# Patient Record
Sex: MALE | Race: BLACK OR AFRICAN AMERICAN | NOT HISPANIC OR LATINO | Employment: FULL TIME | URBAN - NONMETROPOLITAN AREA
[De-identification: names, ages, dates, MRNs, and addresses within clinical notes are randomized per-mention and may not be internally consistent; named-entity substitution may affect disease eponyms.]

---

## 2017-10-08 ENCOUNTER — HOSPITAL ENCOUNTER (EMERGENCY)
Facility: HOSPITAL | Age: 47
Discharge: HOME | End: 2017-10-08
Attending: EMERGENCY MEDICINE | Admitting: EMERGENCY MEDICINE

## 2017-10-08 VITALS
HEIGHT: 71 IN | RESPIRATION RATE: 18 BRPM | WEIGHT: 168 LBS | HEART RATE: 65 BPM | TEMPERATURE: 99.8 F | SYSTOLIC BLOOD PRESSURE: 120 MMHG | BODY MASS INDEX: 23.52 KG/M2 | DIASTOLIC BLOOD PRESSURE: 72 MMHG | OXYGEN SATURATION: 98 %

## 2017-10-08 DIAGNOSIS — R36.9 PENILE DISCHARGE: Primary | ICD-10-CM

## 2017-10-08 LAB
BACTERIA UR QL AUTO: ABNORMAL /HPF
BILIRUB UR QL STRIP: NEGATIVE
CLARITY UR: CLEAR
COLOR UR: YELLOW
GLUCOSE UR STRIP-MCNC: NEGATIVE MG/DL
HGB UR QL STRIP.AUTO: ABNORMAL
HYALINE CASTS UR QL AUTO: ABNORMAL /LPF
KETONES UR QL STRIP: NEGATIVE
LEUKOCYTE ESTERASE UR QL STRIP.AUTO: ABNORMAL
NITRITE UR QL STRIP: NEGATIVE
PH UR STRIP.AUTO: 7 [PH] (ref 5–8)
PROT UR QL STRIP: NEGATIVE
RBC # UR: ABNORMAL /HPF
REF LAB TEST METHOD: ABNORMAL
SP GR UR STRIP: 1.01 (ref 1–1.03)
SQUAMOUS #/AREA URNS HPF: ABNORMAL /HPF
UROBILINOGEN UR QL STRIP: ABNORMAL
WBC UR QL AUTO: ABNORMAL /HPF

## 2017-10-08 PROCEDURE — 25010000002 CEFTRIAXONE PER 250 MG: Performed by: EMERGENCY MEDICINE

## 2017-10-08 PROCEDURE — 96372 THER/PROPH/DIAG INJ SC/IM: CPT

## 2017-10-08 PROCEDURE — 81001 URINALYSIS AUTO W/SCOPE: CPT | Performed by: EMERGENCY MEDICINE

## 2017-10-08 PROCEDURE — 99283 EMERGENCY DEPT VISIT LOW MDM: CPT

## 2017-10-08 PROCEDURE — 87491 CHLMYD TRACH DNA AMP PROBE: CPT | Performed by: EMERGENCY MEDICINE

## 2017-10-08 PROCEDURE — 87591 N.GONORRHOEAE DNA AMP PROB: CPT | Performed by: EMERGENCY MEDICINE

## 2017-10-08 PROCEDURE — 87086 URINE CULTURE/COLONY COUNT: CPT | Performed by: EMERGENCY MEDICINE

## 2017-10-08 RX ORDER — LIDOCAINE HYDROCHLORIDE 10 MG/ML
0.9 INJECTION, SOLUTION EPIDURAL; INFILTRATION; INTRACAUDAL; PERINEURAL ONCE
Status: DISCONTINUED | OUTPATIENT
Start: 2017-10-08 | End: 2017-10-08

## 2017-10-08 RX ORDER — AZITHROMYCIN 250 MG/1
1000 TABLET, FILM COATED ORAL ONCE
Status: COMPLETED | OUTPATIENT
Start: 2017-10-08 | End: 2017-10-08

## 2017-10-08 RX ORDER — CEFTRIAXONE SODIUM 250 MG/1
250 INJECTION, POWDER, FOR SOLUTION INTRAMUSCULAR; INTRAVENOUS ONCE
Status: COMPLETED | OUTPATIENT
Start: 2017-10-08 | End: 2017-10-08

## 2017-10-08 RX ADMIN — AZITHROMYCIN MONOHYDRATE 1000 MG: 250 TABLET ORAL at 14:29

## 2017-10-08 RX ADMIN — CEFTRIAXONE SODIUM 250 MG: 250 INJECTION, POWDER, FOR SOLUTION INTRAMUSCULAR; INTRAVENOUS at 14:31

## 2017-10-08 NOTE — ED PROVIDER NOTES
Subjective   History of Present Illness   47-year-old male otherwise healthy with 2 days of penile discharge that is milky white in nature.  States that he is sexually active with one female who has similar vaginal discharge at this time.  Denies fevers, chills, testicular pain or swelling or other concerns.    Review of Systems   Genitourinary: Positive for discharge.   All other systems reviewed and are negative.      History reviewed. No pertinent past medical history.    No Known Allergies    History reviewed. No pertinent surgical history.    History reviewed. No pertinent family history.    Social History     Social History   • Marital status: Single     Spouse name: N/A   • Number of children: N/A   • Years of education: N/A     Social History Main Topics   • Smoking status: Current Every Day Smoker     Types: Cigarettes   • Smokeless tobacco: None   • Alcohol use Yes      Comment: occassional   • Drug use: No   • Sexual activity: Not Asked     Other Topics Concern   • None     Social History Narrative   • None           Objective   Physical Exam   Constitutional: He is oriented to person, place, and time. He appears well-developed and well-nourished.   Cardiovascular: Normal rate and regular rhythm.    Pulmonary/Chest: Effort normal. No respiratory distress.   Genitourinary: No penile tenderness.   Genitourinary Comments: White, thick discharge at urethral meatus. Testicles non-tender and not swollen.    Neurological: He is alert and oriented to person, place, and time.   Skin: Skin is warm and dry. He is not diaphoretic.   Psychiatric: He has a normal mood and affect. His behavior is normal.   Nursing note and vitals reviewed.      Procedures         ED Course  ED Course                  MDM   47M here w/ suspected STI. Will send ua, gc / chlamydia and treat empirically.     2:41 PM UA unremarkable. Treated empirically as above. Discussed the importance of refraining from sexual intercourse for next 7 days.  Discussed return to care precautions.     Final diagnoses:   Penile discharge            Navi Marie MD  10/08/17 0769

## 2017-10-10 LAB
BACTERIA SPEC AEROBE CULT: NO GROWTH
C TRACH RRNA SPEC DONR QL NAA+PROBE: NEGATIVE
N GONORRHOEA DNA SPEC QL NAA+PROBE: POSITIVE

## 2018-05-11 ENCOUNTER — APPOINTMENT (OUTPATIENT)
Dept: GENERAL RADIOLOGY | Facility: HOSPITAL | Age: 48
End: 2018-05-11

## 2018-05-11 ENCOUNTER — HOSPITAL ENCOUNTER (EMERGENCY)
Facility: HOSPITAL | Age: 48
Discharge: HOME | End: 2018-05-11
Attending: EMERGENCY MEDICINE | Admitting: EMERGENCY MEDICINE

## 2018-05-11 VITALS
RESPIRATION RATE: 16 BRPM | SYSTOLIC BLOOD PRESSURE: 116 MMHG | HEIGHT: 71 IN | HEART RATE: 66 BPM | WEIGHT: 139.4 LBS | TEMPERATURE: 98.2 F | OXYGEN SATURATION: 98 % | DIASTOLIC BLOOD PRESSURE: 72 MMHG | BODY MASS INDEX: 19.52 KG/M2

## 2018-05-11 DIAGNOSIS — S62.326A CLOSED DISPLACED FRACTURE OF SHAFT OF FIFTH METACARPAL BONE OF RIGHT HAND, INITIAL ENCOUNTER: Primary | ICD-10-CM

## 2018-05-11 PROCEDURE — 99283 EMERGENCY DEPT VISIT LOW MDM: CPT

## 2018-05-11 PROCEDURE — 73130 X-RAY EXAM OF HAND: CPT

## 2018-05-11 RX ORDER — HYDROCODONE BITARTRATE AND ACETAMINOPHEN 5; 325 MG/1; MG/1
1 TABLET ORAL EVERY 8 HOURS PRN
Qty: 12 TABLET | Refills: 0 | Status: SHIPPED | OUTPATIENT
Start: 2018-05-11 | End: 2018-05-17 | Stop reason: HOSPADM

## 2018-05-11 RX ORDER — HYDROCODONE BITARTRATE AND ACETAMINOPHEN 7.5; 325 MG/1; MG/1
1 TABLET ORAL ONCE
Status: COMPLETED | OUTPATIENT
Start: 2018-05-11 | End: 2018-05-11

## 2018-05-11 RX ADMIN — HYDROCODONE BITARTRATE AND ACETAMINOPHEN 1 TABLET: 7.5; 325 TABLET ORAL at 15:14

## 2018-05-11 NOTE — DISCHARGE INSTRUCTIONS
Keep the splint dry.  Loosen the Ace wraps if your fingers become pale, purple or blue.    Follow-up with Dr. Garcia, orthopedic specialist, in 2-3 days for definitive management.  Call for appointment.     Off work for 2 days.  No lifting, pushing or pulling with the affected hand until cleared by Dr. Garcia.

## 2018-05-11 NOTE — ED PROVIDER NOTES
Subjective   47-year-old black male apparently injured his right hand lifting weights 2 days ago.  States the accidentally punched a 40 pound weight that was sitting on the floor, when he brought his hands down after lifting.  No paresthesias or loss of function.  Complains of moderate aching type pain to the distal fifth metacarpal region.  No lacerations.  Slight deformity noted.  He has never broken this hand before.    Aggravating factors: Palpation or movement.  Alleviating factors: Rest.  Treatment prior to arrival: Rest.        History provided by:  Patient  History limited by: nothing.   used: No        Review of Systems   Constitutional: Negative.    HENT: Negative.    Eyes: Negative.    Respiratory: Negative.    Cardiovascular: Negative.  Negative for chest pain.   Gastrointestinal: Negative.  Negative for abdominal pain.   Endocrine: Negative.    Genitourinary: Negative for dysuria.   Musculoskeletal: Positive for arthralgias and joint swelling.   Skin: Negative.    Allergic/Immunologic: Negative.    Neurological: Negative.    Hematological: Negative.    Psychiatric/Behavioral: Negative.    All other systems reviewed and are negative.      History reviewed. No pertinent past medical history.    No Known Allergies    History reviewed. No pertinent surgical history.    History reviewed. No pertinent family history.    Social History     Social History   • Marital status: Single     Social History Main Topics   • Smoking status: Current Every Day Smoker     Types: Cigarettes   • Smokeless tobacco: Never Used   • Alcohol use Yes      Comment: occassional   • Drug use: No   • Sexual activity: Defer     Other Topics Concern   • Not on file           Objective   Physical Exam   Constitutional: He is oriented to person, place, and time. He appears well-developed and well-nourished. No distress.   HENT:   Head: Normocephalic and atraumatic.   Right Ear: External ear normal.   Left Ear: External  ear normal.   Eyes: EOM are normal. Pupils are equal, round, and reactive to light.   Neck: Normal range of motion. Neck supple.   Cardiovascular: Normal rate, regular rhythm and normal heart sounds.    Pulmonary/Chest: Effort normal and breath sounds normal. No stridor. He has no wheezes. He exhibits no tenderness.   Musculoskeletal: Normal range of motion. He exhibits no edema.   The distal right fifth metatarsal is moderately tender and swollen.  Neurovascular and skin are intact to the right hand.  The right wrist is nontender.   Neurological: He is alert and oriented to person, place, and time.   Skin: Skin is warm and dry. No rash noted. He is not diaphoretic.   Psychiatric: He has a normal mood and affect. His behavior is normal. Judgment and thought content normal.   Nursing note and vitals reviewed.      Splint - Cast - Strapping  Date/Time: 5/11/2018 3:12 PM  Performed by: SHERRY FRANKLIN  Authorized by: INDER MEJIAS     Consent:     Consent obtained:  Verbal    Consent given by:  Patient    Risks discussed:  Discoloration and swelling  Pre-procedure details:     Sensation:  Normal  Procedure details:     Laterality:  Right    Location:  Hand    Hand:  R hand    Splint type:  Ulnar gutter    Supplies:  Ortho-Glass, elastic bandage and cotton padding  Post-procedure details:     Pain:  Improved    Sensation:  Normal    Patient tolerance of procedure:  Tolerated well, no immediate complications               ED Course  ED Course                  MDM  Number of Diagnoses or Management Options  Closed displaced fracture of shaft of fifth metacarpal bone of right hand, initial encounter: new and requires workup  Diagnosis management comments: I explained to the patient that this may require surgery as it is angulated and displaced.  He verbalized understanding.  He understands he needs to see orthopedics as soon as possible.  Dr. Garcia's phone number was supplied to him.       Amount and/or  Complexity of Data Reviewed  Tests in the radiology section of CPT®: ordered and reviewed  Discuss the patient with other providers: yes    Risk of Complications, Morbidity, and/or Mortality  Presenting problems: moderate  Diagnostic procedures: low  Management options: moderate    Patient Progress  Patient progress: stable        Final diagnoses:   Closed displaced fracture of shaft of fifth metacarpal bone of right hand, initial encounter            David Dominguez PA-C  05/11/18 1517       David Dominguez PA-C  05/11/18 1524

## 2018-05-14 ENCOUNTER — APPOINTMENT (OUTPATIENT)
Dept: PREADMISSION TESTING | Facility: HOSPITAL | Age: 48
End: 2018-05-14

## 2018-05-14 ENCOUNTER — OFFICE VISIT (OUTPATIENT)
Dept: ORTHOPEDIC SURGERY | Facility: CLINIC | Age: 48
End: 2018-05-14

## 2018-05-14 ENCOUNTER — HOSPITAL ENCOUNTER (OUTPATIENT)
Dept: GENERAL RADIOLOGY | Facility: HOSPITAL | Age: 48
Discharge: HOME | End: 2018-05-14
Admitting: PHYSICIAN ASSISTANT

## 2018-05-14 ENCOUNTER — PREP FOR SURGERY (OUTPATIENT)
Dept: OTHER | Facility: HOSPITAL | Age: 48
End: 2018-05-14

## 2018-05-14 VITALS — WEIGHT: 145 LBS | BODY MASS INDEX: 20.3 KG/M2 | HEIGHT: 71 IN

## 2018-05-14 VITALS — HEIGHT: 71 IN | WEIGHT: 139 LBS | BODY MASS INDEX: 19.46 KG/M2 | RESPIRATION RATE: 18 BRPM

## 2018-05-14 DIAGNOSIS — Z01.818 PRE-OP TESTING: Primary | ICD-10-CM

## 2018-05-14 DIAGNOSIS — Z01.818 PRE-OP TESTING: ICD-10-CM

## 2018-05-14 DIAGNOSIS — S62.396A CLOSED DISPLACED FRACTURE OF OTHER PART OF FIFTH METACARPAL BONE OF RIGHT HAND, INITIAL ENCOUNTER: Primary | ICD-10-CM

## 2018-05-14 LAB
ALBUMIN SERPL-MCNC: 4.7 G/DL (ref 3.5–5)
ALBUMIN/GLOB SERPL: 1.3 G/DL (ref 1–2)
ALP SERPL-CCNC: 88 U/L (ref 38–126)
ALT SERPL W P-5'-P-CCNC: 30 U/L (ref 13–69)
ANION GAP SERPL CALCULATED.3IONS-SCNC: 14.1 MMOL/L (ref 10–20)
AST SERPL-CCNC: 43 U/L (ref 15–46)
BASOPHILS # BLD AUTO: 0.02 10*3/MM3 (ref 0–0.2)
BASOPHILS NFR BLD AUTO: 0.4 % (ref 0–2.5)
BILIRUB SERPL-MCNC: 0.3 MG/DL (ref 0.2–1.3)
BUN BLD-MCNC: 15 MG/DL (ref 7–20)
BUN/CREAT SERPL: 15 (ref 6.3–21.9)
CALCIUM SPEC-SCNC: 9.3 MG/DL (ref 8.4–10.2)
CHLORIDE SERPL-SCNC: 102 MMOL/L (ref 98–107)
CO2 SERPL-SCNC: 32 MMOL/L (ref 26–30)
CREAT BLD-MCNC: 1 MG/DL (ref 0.6–1.3)
DEPRECATED RDW RBC AUTO: 48.4 FL (ref 37–54)
EOSINOPHIL # BLD AUTO: 0.06 10*3/MM3 (ref 0–0.7)
EOSINOPHIL NFR BLD AUTO: 1.1 % (ref 0–7)
ERYTHROCYTE [DISTWIDTH] IN BLOOD BY AUTOMATED COUNT: 14 % (ref 11.5–14.5)
GFR SERPL CREATININE-BSD FRML MDRD: 97 ML/MIN/1.73
GLOBULIN UR ELPH-MCNC: 3.5 GM/DL
GLUCOSE BLD-MCNC: 97 MG/DL (ref 74–98)
HCT VFR BLD AUTO: 42.5 % (ref 42–52)
HGB BLD-MCNC: 14.3 G/DL (ref 14–18)
IMM GRANULOCYTES # BLD: 0.02 10*3/MM3 (ref 0–0.06)
IMM GRANULOCYTES NFR BLD: 0.4 % (ref 0–0.6)
LYMPHOCYTES # BLD AUTO: 2.36 10*3/MM3 (ref 0.6–3.4)
LYMPHOCYTES NFR BLD AUTO: 43.5 % (ref 10–50)
MCH RBC QN AUTO: 31.6 PG (ref 27–31)
MCHC RBC AUTO-ENTMCNC: 33.6 G/DL (ref 30–37)
MCV RBC AUTO: 93.8 FL (ref 80–94)
MONOCYTES # BLD AUTO: 0.43 10*3/MM3 (ref 0–0.9)
MONOCYTES NFR BLD AUTO: 7.9 % (ref 0–12)
NEUTROPHILS # BLD AUTO: 2.54 10*3/MM3 (ref 2–6.9)
NEUTROPHILS NFR BLD AUTO: 46.7 % (ref 37–80)
NRBC BLD MANUAL-RTO: 0 /100 WBC (ref 0–0)
PLATELET # BLD AUTO: 273 10*3/MM3 (ref 130–400)
PMV BLD AUTO: 9.3 FL (ref 6–12)
POTASSIUM BLD-SCNC: 4.1 MMOL/L (ref 3.5–5.1)
PROT SERPL-MCNC: 8.2 G/DL (ref 6.3–8.2)
RBC # BLD AUTO: 4.53 10*6/MM3 (ref 4.7–6.1)
SODIUM BLD-SCNC: 144 MMOL/L (ref 137–145)
WBC NRBC COR # BLD: 5.43 10*3/MM3 (ref 4.8–10.8)

## 2018-05-14 PROCEDURE — 87081 CULTURE SCREEN ONLY: CPT | Performed by: PHYSICIAN ASSISTANT

## 2018-05-14 PROCEDURE — 93005 ELECTROCARDIOGRAM TRACING: CPT | Performed by: PHYSICIAN ASSISTANT

## 2018-05-14 PROCEDURE — 99203 OFFICE O/P NEW LOW 30 MIN: CPT | Performed by: PHYSICIAN ASSISTANT

## 2018-05-14 PROCEDURE — 85025 COMPLETE CBC W/AUTO DIFF WBC: CPT | Performed by: PHYSICIAN ASSISTANT

## 2018-05-14 PROCEDURE — 80053 COMPREHEN METABOLIC PANEL: CPT | Performed by: PHYSICIAN ASSISTANT

## 2018-05-14 PROCEDURE — 71046 X-RAY EXAM CHEST 2 VIEWS: CPT

## 2018-05-14 PROCEDURE — 36415 COLL VENOUS BLD VENIPUNCTURE: CPT

## 2018-05-14 RX ORDER — IBUPROFEN 200 MG
200 TABLET ORAL EVERY 6 HOURS PRN
COMMUNITY

## 2018-05-14 NOTE — DISCHARGE INSTRUCTIONS
Patient instructed on PAT PASS information.  Patient/family member verbalized understanding of instruction/education. DO NOT EAT, DRINK, SMOKE, USE SMOKELESS TOBACCO OR CHEW GUM AFTER MIDNIGHT THE NIGHT BEFORE SURGERY.  THIS ALSO INCLUDES HARD CANDIES AND MINTS.    DO NOT SHAVE THE AREA TO BE OPERATED ON AT LEAST 48 HOURS PRIOR TO THE PROCEDURE.  DO NOT WEAR MAKE UP OR NAIL POLISH.  DO NOT LEAVE IN ANY PIERCING OR WEAR JEWELRY THE DAY OF SURGERY.      DO NOT USE ADHESIVES IF YOU WEAR DENTURES.    DO NOT WEAR EYE CONTACTS; BRING IN YOUR GLASSES.    ONLY TAKE MEDICATION THE MORNING OF YOUR PROCEDURE IF INSTRUCTED BY YOUR SURGEON WITH ENOUGH WATER TO SWALLOW THE MEDICATION.  IF YOUR SURGEON DID NOT SPECIFY WHICH MEDICATIONS TO TAKE, YOU WILL NEED TO CALL THEIR OFFICE FOR FURTHER INSTRUCTIONS AND DO AS THEY INSTRUCT.    LEAVE ANYTHING YOU CONSIDER VALUABLE AT HOME.    YOU WILL NEED TO ARRANGE FOR SOMEONE TO DRIVE YOU HOME AFTER SURGERY.  IT IS RECOMMENDED THAT YOU DO NOT DRIVE, WORK, DRINK ALCOHOL OR MAKE MAJOR DECISIONS FOR AT LEAST 24 HOURS AFTER YOUR PROCEDURE IS COMPLETE.      THE DAY OF YOUR PROCEDURE, BRING IN THE FOLLOWING IF APPLICABLE:   PICTURE ID AND INSURANCE/MEDICARE OR MEDICAID CARDS   COPY OF ADVANCED DIRECTIVE/LIVING WILL/POWER OR    CPAP/BIPAP/INHALERS   SKIN PREP SHEET   YOUR PREADMISSION TESTING PASS (IF NOT A PHONE HISTORY)        PT WITH CAST/SPLINT UNABLE TO USE WIPES

## 2018-05-14 NOTE — PROGRESS NOTES
Subjective   Patient ID: Phil Case is a 47 y.o. right hand dominant male  Pain of the Right Hand         History of Present Illness      Patient presents as a new patient with complaints of right hand injury.  He states on 5/9/2018 while lifting weights he accidentally hit his right hand on a 40 pound weight that was sitting on the floor.  He denies numbness or tingling.  He went to the emergency room on 5/11/2018 was diagnosed with a hand fracture and placed in a splint.      Pain Score: 5  Pain Location: Hand  Pain Orientation: Right     Pain Descriptors: Aching, Burning  Pain Frequency: Intermittent     Date Pain First Started:  (about a week ago)              Pain Intervention(s): Rest, Cold applied          Past Medical History:   Diagnosis Date   • Asthma     AS A CHILD   • Hand injury     RIGHT   • Wears glasses     READING ONLY        Past Surgical History:   Procedure Laterality Date   • NO PAST SURGERIES         Family History   Problem Relation Age of Onset   • Hypertension Mother    • Diabetes Father    • Cancer Sister    • No Known Problems Brother        Social History     Social History   • Marital status: Single     Spouse name: N/A   • Number of children: N/A   • Years of education: N/A     Occupational History   • Not on file.     Social History Main Topics   • Smoking status: Current Every Day Smoker     Packs/day: 0.25     Years: 10.00     Types: Cigarettes   • Smokeless tobacco: Never Used   • Alcohol use Yes      Comment: occassional   • Drug use: No   • Sexual activity: Defer     Other Topics Concern   • Not on file     Social History Narrative   • No narrative on file         Current Outpatient Prescriptions:   •  HYDROcodone-acetaminophen (NORCO) 5-325 MG per tablet, Take 1 tablet by mouth Every 8 (Eight) Hours As Needed for Moderate Pain  or Severe Pain ., Disp: 12 tablet, Rfl: 0  •  ibuprofen (ADVIL,MOTRIN) 200 MG tablet, Take 200 mg by mouth Every 6 (Six) Hours As Needed for  "Mild Pain ., Disp: , Rfl:     No Known Allergies    Review of Systems   Constitutional: Negative for fever.   HENT: Negative for voice change.    Eyes: Negative for visual disturbance.   Respiratory: Negative for shortness of breath.    Cardiovascular: Negative for chest pain.   Gastrointestinal: Negative for abdominal distention and abdominal pain.   Genitourinary: Negative for dysuria.   Musculoskeletal: Positive for arthralgias. Negative for gait problem and joint swelling.   Skin: Negative for rash.   Neurological: Negative for speech difficulty.   Hematological: Does not bruise/bleed easily.   Psychiatric/Behavioral: Negative for confusion.       Objective   Resp 18   Ht 180.3 cm (71\")   Wt 63 kg (139 lb)   BMI 19.39 kg/m²    Physical Exam   Constitutional: He is oriented to person, place, and time. He appears well-nourished.   Eyes: Conjunctivae are normal.   Neck: No tracheal deviation present.   Cardiovascular: Normal rate.    Pulmonary/Chest: Effort normal. No respiratory distress.   Abdominal: He exhibits no distension.   Musculoskeletal:        Right elbow: He exhibits normal range of motion and no swelling. No tenderness found.        Right wrist: He exhibits normal range of motion, no tenderness, no bony tenderness, no swelling, no effusion, no crepitus, no deformity and no laceration.        Right hand: He exhibits tenderness (5th MC), bony tenderness, deformity (scissoring effect with flexion of the 5th digit) and swelling. Normal sensation noted. Normal strength noted. He exhibits no thumb/finger opposition.   Neurological: He is alert and oriented to person, place, and time.   Skin: Capillary refill takes less than 2 seconds. No rash noted.   Psychiatric: He has a normal mood and affect. His behavior is normal.   Vitals reviewed.    Ortho Exam   Extremity DVT signs are Negative by clinical screen.   Neurologic Exam     Mental Status   Oriented to person, place, and time.      Ortho Exam     No " snuffbox ttp  Neg josue's test      Assessment/Plan   Independent Review of Radiographic Studies:    No new imaging done today.  I did review x-ray imaging from the [facility] emergency room there is a fifth metacarpal head fracture with volar angulation.    Procedures       Phil was seen today for pain.    Diagnoses and all orders for this visit:    Closed displaced fracture of other part of fifth metacarpal bone of right hand, initial encounter    Pre-op testing       Orthopedic activities reviewed and patient expressed appreciation  Discussion of orthopedic goals  Risk, benefits, and merits of treatment alternatives reviewed with the patient and questions answered  The nature of the proposed surgery reviewed with the patient including risks, benefits, rehabilitation, recovery timeframe, and outcome expectations    Recommendations/Plan:  Patient is encouraged to call or return for any issues or concerns.    Risks, benefits, and alternative treatments discussed with the patient: [x] Yes [] No    Risk benefits and merits of the proposed surgery were discussed and the patient's questions were answered risks discussed including and not limited to:  Anesthesia reactions  Infection  Deep venous thrombosis and pulmonary embolus  Nerve, vascular or tendon injury  Fracture  Deformity  Stiffness  Weakness  Prosthesis and implant issues such as loosening, material wear or dislocation  Skin necrosis  Revision surgery or further treatment  Recurrence of problem and condition     Informed consent: [] Signed  [x] To be obtained at hospital  [] Both    Plan: Right hand closed reduction and pinning of the fifth metacarpal versus ORIF.    Patient was placed back into the Ortho-Glass boxer splint that he was already in     Patient agreeable to call or return sooner for any concerns.

## 2018-05-16 DIAGNOSIS — Z87.81 S/P ORIF (OPEN REDUCTION INTERNAL FIXATION) FRACTURE: Primary | ICD-10-CM

## 2018-05-16 DIAGNOSIS — Z98.890 S/P ORIF (OPEN REDUCTION INTERNAL FIXATION) FRACTURE: Primary | ICD-10-CM

## 2018-05-16 LAB — MRSA SPEC QL CULT: NORMAL

## 2018-05-16 RX ORDER — HYDROCODONE BITARTRATE AND ACETAMINOPHEN 5; 325 MG/1; MG/1
1 TABLET ORAL EVERY 6 HOURS PRN
Qty: 40 TABLET | Refills: 0 | Status: SHIPPED | OUTPATIENT
Start: 2018-05-16 | End: 2018-05-30 | Stop reason: SDUPTHER

## 2018-05-17 ENCOUNTER — ANESTHESIA EVENT (OUTPATIENT)
Dept: PERIOP | Facility: HOSPITAL | Age: 48
End: 2018-05-17

## 2018-05-17 ENCOUNTER — HOSPITAL ENCOUNTER (OUTPATIENT)
Facility: HOSPITAL | Age: 48
Setting detail: HOSPITAL OUTPATIENT SURGERY
Discharge: HOME | End: 2018-05-17
Attending: ORTHOPAEDIC SURGERY | Admitting: ORTHOPAEDIC SURGERY

## 2018-05-17 ENCOUNTER — APPOINTMENT (OUTPATIENT)
Dept: GENERAL RADIOLOGY | Facility: HOSPITAL | Age: 48
End: 2018-05-17

## 2018-05-17 ENCOUNTER — ANESTHESIA (OUTPATIENT)
Dept: PERIOP | Facility: HOSPITAL | Age: 48
End: 2018-05-17

## 2018-05-17 VITALS
OXYGEN SATURATION: 99 % | RESPIRATION RATE: 16 BRPM | SYSTOLIC BLOOD PRESSURE: 122 MMHG | DIASTOLIC BLOOD PRESSURE: 67 MMHG | HEART RATE: 49 BPM | TEMPERATURE: 98 F

## 2018-05-17 PROCEDURE — 26608 TREAT METACARPAL FRACTURE: CPT | Performed by: ORTHOPAEDIC SURGERY

## 2018-05-17 PROCEDURE — 25010000002 DEXAMETHASONE PER 1 MG: Performed by: NURSE ANESTHETIST, CERTIFIED REGISTERED

## 2018-05-17 PROCEDURE — 25010000002 FENTANYL CITRATE (PF) 250 MCG/5ML SOLUTION: Performed by: NURSE ANESTHETIST, CERTIFIED REGISTERED

## 2018-05-17 PROCEDURE — 25010000003 CEFAZOLIN PER 500 MG: Performed by: PHYSICIAN ASSISTANT

## 2018-05-17 PROCEDURE — C1713 ANCHOR/SCREW BN/BN,TIS/BN: HCPCS | Performed by: ORTHOPAEDIC SURGERY

## 2018-05-17 PROCEDURE — 25010000002 KETOROLAC TROMETHAMINE PER 15 MG: Performed by: NURSE ANESTHETIST, CERTIFIED REGISTERED

## 2018-05-17 PROCEDURE — 25010000002 METOCLOPRAMIDE PER 10 MG: Performed by: NURSE ANESTHETIST, CERTIFIED REGISTERED

## 2018-05-17 PROCEDURE — 25010000002 ONDANSETRON PER 1 MG: Performed by: NURSE ANESTHETIST, CERTIFIED REGISTERED

## 2018-05-17 PROCEDURE — 76000 FLUOROSCOPY <1 HR PHYS/QHP: CPT

## 2018-05-17 PROCEDURE — 25010000002 PROPOFOL 200 MG/20ML EMULSION: Performed by: NURSE ANESTHETIST, CERTIFIED REGISTERED

## 2018-05-17 DEVICE — IMPLANTABLE DEVICE: Type: IMPLANTABLE DEVICE | Site: HAND | Status: FUNCTIONAL

## 2018-05-17 RX ORDER — ONDANSETRON 2 MG/ML
INJECTION INTRAMUSCULAR; INTRAVENOUS AS NEEDED
Status: DISCONTINUED | OUTPATIENT
Start: 2018-05-17 | End: 2018-05-17 | Stop reason: SURG

## 2018-05-17 RX ORDER — PROPOFOL 10 MG/ML
INJECTION, EMULSION INTRAVENOUS AS NEEDED
Status: DISCONTINUED | OUTPATIENT
Start: 2018-05-17 | End: 2018-05-17 | Stop reason: SURG

## 2018-05-17 RX ORDER — LIDOCAINE HYDROCHLORIDE 20 MG/ML
INJECTION, SOLUTION EPIDURAL; INFILTRATION; INTRACAUDAL; PERINEURAL AS NEEDED
Status: DISCONTINUED | OUTPATIENT
Start: 2018-05-17 | End: 2018-05-17 | Stop reason: SURG

## 2018-05-17 RX ORDER — MAGNESIUM HYDROXIDE 1200 MG/15ML
LIQUID ORAL AS NEEDED
Status: DISCONTINUED | OUTPATIENT
Start: 2018-05-17 | End: 2018-05-17 | Stop reason: HOSPADM

## 2018-05-17 RX ORDER — SODIUM CHLORIDE 0.9 % (FLUSH) 0.9 %
3 SYRINGE (ML) INJECTION AS NEEDED
Status: DISCONTINUED | OUTPATIENT
Start: 2018-05-17 | End: 2018-05-17 | Stop reason: HOSPADM

## 2018-05-17 RX ORDER — ONDANSETRON 2 MG/ML
4 INJECTION INTRAMUSCULAR; INTRAVENOUS ONCE AS NEEDED
Status: DISCONTINUED | OUTPATIENT
Start: 2018-05-17 | End: 2018-05-17 | Stop reason: HOSPADM

## 2018-05-17 RX ORDER — MORPHINE SULFATE 2 MG/ML
2 INJECTION, SOLUTION INTRAMUSCULAR; INTRAVENOUS
Status: DISCONTINUED | OUTPATIENT
Start: 2018-05-17 | End: 2018-05-17 | Stop reason: HOSPADM

## 2018-05-17 RX ORDER — KETOROLAC TROMETHAMINE 30 MG/ML
INJECTION, SOLUTION INTRAMUSCULAR; INTRAVENOUS AS NEEDED
Status: DISCONTINUED | OUTPATIENT
Start: 2018-05-17 | End: 2018-05-17 | Stop reason: SURG

## 2018-05-17 RX ORDER — METOCLOPRAMIDE HYDROCHLORIDE 5 MG/ML
INJECTION INTRAMUSCULAR; INTRAVENOUS AS NEEDED
Status: DISCONTINUED | OUTPATIENT
Start: 2018-05-17 | End: 2018-05-17 | Stop reason: SURG

## 2018-05-17 RX ORDER — MEPERIDINE HYDROCHLORIDE 50 MG/ML
25 INJECTION INTRAMUSCULAR; INTRAVENOUS; SUBCUTANEOUS
Status: DISCONTINUED | OUTPATIENT
Start: 2018-05-17 | End: 2018-05-17 | Stop reason: HOSPADM

## 2018-05-17 RX ORDER — DEXAMETHASONE SODIUM PHOSPHATE 4 MG/ML
INJECTION, SOLUTION INTRA-ARTICULAR; INTRALESIONAL; INTRAMUSCULAR; INTRAVENOUS; SOFT TISSUE AS NEEDED
Status: DISCONTINUED | OUTPATIENT
Start: 2018-05-17 | End: 2018-05-17 | Stop reason: SURG

## 2018-05-17 RX ORDER — SODIUM CHLORIDE, SODIUM LACTATE, POTASSIUM CHLORIDE, CALCIUM CHLORIDE 600; 310; 30; 20 MG/100ML; MG/100ML; MG/100ML; MG/100ML
1000 INJECTION, SOLUTION INTRAVENOUS CONTINUOUS
Status: DISCONTINUED | OUTPATIENT
Start: 2018-05-17 | End: 2018-05-17 | Stop reason: HOSPADM

## 2018-05-17 RX ORDER — FENTANYL CITRATE 50 UG/ML
INJECTION, SOLUTION INTRAMUSCULAR; INTRAVENOUS AS NEEDED
Status: DISCONTINUED | OUTPATIENT
Start: 2018-05-17 | End: 2018-05-17 | Stop reason: SURG

## 2018-05-17 RX ADMIN — FENTANYL CITRATE 100 MCG: 50 INJECTION, SOLUTION INTRAMUSCULAR; INTRAVENOUS at 10:06

## 2018-05-17 RX ADMIN — ONDANSETRON 8 MG: 2 INJECTION INTRAMUSCULAR; INTRAVENOUS at 10:06

## 2018-05-17 RX ADMIN — SODIUM CHLORIDE, POTASSIUM CHLORIDE, SODIUM LACTATE AND CALCIUM CHLORIDE 1000 ML: 600; 310; 30; 20 INJECTION, SOLUTION INTRAVENOUS at 08:45

## 2018-05-17 RX ADMIN — FAMOTIDINE 20 MG: 10 INJECTION INTRAVENOUS at 08:57

## 2018-05-17 RX ADMIN — SODIUM CHLORIDE, POTASSIUM CHLORIDE, SODIUM LACTATE AND CALCIUM CHLORIDE: 600; 310; 30; 20 INJECTION, SOLUTION INTRAVENOUS at 10:49

## 2018-05-17 RX ADMIN — DEXAMETHASONE SODIUM PHOSPHATE 10 MG: 4 INJECTION, SOLUTION INTRAMUSCULAR; INTRAVENOUS at 10:06

## 2018-05-17 RX ADMIN — CEFAZOLIN SODIUM 2 G: 2 SOLUTION INTRAVENOUS at 09:57

## 2018-05-17 RX ADMIN — KETOROLAC TROMETHAMINE 30 MG: 30 INJECTION, SOLUTION INTRAMUSCULAR at 10:35

## 2018-05-17 RX ADMIN — METOCLOPRAMIDE 10 MG: 5 INJECTION, SOLUTION INTRAMUSCULAR; INTRAVENOUS at 10:06

## 2018-05-17 RX ADMIN — FENTANYL CITRATE 50 MCG: 50 INJECTION, SOLUTION INTRAMUSCULAR; INTRAVENOUS at 10:20

## 2018-05-17 RX ADMIN — LIDOCAINE HYDROCHLORIDE 100 MG: 20 INJECTION, SOLUTION EPIDURAL; INFILTRATION; INTRACAUDAL; PERINEURAL at 10:06

## 2018-05-17 RX ADMIN — PROPOFOL 200 MG: 10 INJECTION, EMULSION INTRAVENOUS at 10:06

## 2018-05-17 RX ADMIN — FENTANYL CITRATE 50 MCG: 50 INJECTION, SOLUTION INTRAMUSCULAR; INTRAVENOUS at 10:15

## 2018-05-17 RX ADMIN — FENTANYL CITRATE 50 MCG: 50 INJECTION, SOLUTION INTRAMUSCULAR; INTRAVENOUS at 10:25

## 2018-05-17 NOTE — ANESTHESIA PROCEDURE NOTES
Peripheral Block    Start time: 5/17/2018 11:12 AM  Stop time: 5/17/2018 11:17 AM  Reason for block: at surgeon's request and post-op pain management  Performed by  CRNA: TERESA NORRIS  Preanesthetic Checklist  Completed: patient identified, site marked, surgical consent, pre-op evaluation, timeout performed, IV checked, risks and benefits discussed and monitors and equipment checked  Prep:  Pt Position: supine  Sterile barriers:cap, gloves, mask and sterile barriers  Prep: ChloraPrep  Patient monitoring: blood pressure monitoring, continuous pulse oximetry and EKG  Procedure  Sedation:yes  Performed under: local infiltration  Guidance:ultrasound guided  ULTRASOUND INTERPRETATION.  Using ultrasound guidance a 20 G gauge needle was placed in close proximity to the brachial plexus nerve, at which point, under ultrasound guidance anesthetic was injected in the area of the nerve and spread of the anesthesia was seen on ultrasound in close proximity thereto.  There were no abnormalities seen on ultrasound; a digital image was taken; and the patient tolerated the procedure with no complications. Images:still images obtained  Loss of twitch: 0.6 mA  Laterality:right  Block Type:infraclavicular  Injection Technique:single-shot  Needle Type:echogenic  Needle Gauge:20 G  Resistance on Injection: none  Medications  Comment:  If required, intravenous sedation was given -- see meds on anesthesia record.  Local Injected:bupivacaine 0.5% Local Amount Injected:20mL  Post Assessment  Injection Assessment: negative aspiration for heme, no paresthesia on injection and incremental injection  Patient Tolerance:comfortable throughout block  Complications:no  Additional Notes   Procedure:               SINGLE SHOT INFRACLAVICULAR                                       Patient analgesia was achieved with Skin infiltration 2ml Lidocaine or Bupivacaine       The pt was placed in semi-fowlers position with a slight tilt of the thorax  contralateral to the insertion site.  The Insertion Site was prepped and draped in sterile fashion.  The skin was anesthetized with Lidocaine 1% 1ml injection utilizing a 25g needle.  Utilizing ultrasound guidance, a BBraun 20 ga echogenic needle was advanced in-plane.  Major vessels (carotid and Internal Jugular) were visualized as the brachial plexus was approached anterior.  The Lateral and Medial cords were identified.  The needle tip was placed posterior to the subclavian artery and Local anesthesia was injected incrementally every 5 mls with aspiration. Injection pressure was normal or little; there was no intraneural injection, no vascular injection.

## 2018-05-17 NOTE — DISCHARGE INSTRUCTIONS
Please follow all post op instructions and follow up appointment time from your physician's office included in your discharge packet.    Keep the affected extremity elevated above  level of the heart.  Use your ice pack as instructed, do not use continuously.  Use your  sling as directed  Follow your physicians instructions as previously directed.    No pushing, pulling, tugging,  heavy lifting, or strenuous activity.  No major decision making, driving, or drinking alcoholic beverages for 24 hours. ( due to the medications you have  received)  Always use good hand hygiene/washing techniques.  NO driving while taking pain medications.    To assist you in voiding:  Drink plenty of fluids  Listen to running water while attempting to void.    If you are unable to urinate and you have an uncomfortable urge to void or it has been   6 hours since you were discharged, return to the Emergency Room

## 2018-05-17 NOTE — ANESTHESIA PROCEDURE NOTES
Airway  Urgency: elective    Airway not difficult    General Information and Staff    Patient location during procedure: OR  CRNA: TERESA NORRIS    Indications and Patient Condition  Indications for airway management: airway protection    Preoxygenated: yes  Mask difficulty assessment: 1 - vent by mask    Final Airway Details  Final airway type: supraglottic airway      Successful airway: unique  Size 4    Number of attempts at approach: 1

## 2018-05-17 NOTE — INTERVAL H&P NOTE
H&P reviewed. The patient was examined and there are no changes to the H&P, inclusive of the physical exam heart, lungs and procedure site specific exam as noted on the current (within 30 days) history and physical full detailed report.    /71 (BP Location: Left arm, Patient Position: Lying)   Pulse 63   Temp 98.5 °F (36.9 °C) (Temporal Artery )   Resp 16   SpO2 100%       Ranjit Garcia MD  5/17/2018  9:53 AM

## 2018-05-17 NOTE — H&P (VIEW-ONLY)
Subjective   Patient ID: Phil Case is a 47 y.o. right hand dominant male  Pain of the Right Hand         History of Present Illness      Patient presents as a new patient with complaints of right hand injury.  He states on 5/9/2018 while lifting weights he accidentally hit his right hand on a 40 pound weight that was sitting on the floor.  He denies numbness or tingling.  He went to the emergency room on 5/11/2018 was diagnosed with a hand fracture and placed in a splint.      Pain Score: 5  Pain Location: Hand  Pain Orientation: Right     Pain Descriptors: Aching, Burning  Pain Frequency: Intermittent     Date Pain First Started:  (about a week ago)              Pain Intervention(s): Rest, Cold applied          Past Medical History:   Diagnosis Date   • Asthma     AS A CHILD   • Hand injury     RIGHT   • Wears glasses     READING ONLY        Past Surgical History:   Procedure Laterality Date   • NO PAST SURGERIES         Family History   Problem Relation Age of Onset   • Hypertension Mother    • Diabetes Father    • Cancer Sister    • No Known Problems Brother        Social History     Social History   • Marital status: Single     Spouse name: N/A   • Number of children: N/A   • Years of education: N/A     Occupational History   • Not on file.     Social History Main Topics   • Smoking status: Current Every Day Smoker     Packs/day: 0.25     Years: 10.00     Types: Cigarettes   • Smokeless tobacco: Never Used   • Alcohol use Yes      Comment: occassional   • Drug use: No   • Sexual activity: Defer     Other Topics Concern   • Not on file     Social History Narrative   • No narrative on file         Current Outpatient Prescriptions:   •  HYDROcodone-acetaminophen (NORCO) 5-325 MG per tablet, Take 1 tablet by mouth Every 8 (Eight) Hours As Needed for Moderate Pain  or Severe Pain ., Disp: 12 tablet, Rfl: 0  •  ibuprofen (ADVIL,MOTRIN) 200 MG tablet, Take 200 mg by mouth Every 6 (Six) Hours As Needed for  "Mild Pain ., Disp: , Rfl:     No Known Allergies    Review of Systems   Constitutional: Negative for fever.   HENT: Negative for voice change.    Eyes: Negative for visual disturbance.   Respiratory: Negative for shortness of breath.    Cardiovascular: Negative for chest pain.   Gastrointestinal: Negative for abdominal distention and abdominal pain.   Genitourinary: Negative for dysuria.   Musculoskeletal: Positive for arthralgias. Negative for gait problem and joint swelling.   Skin: Negative for rash.   Neurological: Negative for speech difficulty.   Hematological: Does not bruise/bleed easily.   Psychiatric/Behavioral: Negative for confusion.       Objective   Resp 18   Ht 180.3 cm (71\")   Wt 63 kg (139 lb)   BMI 19.39 kg/m²    Physical Exam   Constitutional: He is oriented to person, place, and time. He appears well-nourished.   Eyes: Conjunctivae are normal.   Neck: No tracheal deviation present.   Cardiovascular: Normal rate.    Pulmonary/Chest: Effort normal. No respiratory distress.   Abdominal: He exhibits no distension.   Musculoskeletal:        Right elbow: He exhibits normal range of motion and no swelling. No tenderness found.        Right wrist: He exhibits normal range of motion, no tenderness, no bony tenderness, no swelling, no effusion, no crepitus, no deformity and no laceration.        Right hand: He exhibits tenderness (5th MC), bony tenderness, deformity (scissoring effect with flexion of the 5th digit) and swelling. Normal sensation noted. Normal strength noted. He exhibits no thumb/finger opposition.   Neurological: He is alert and oriented to person, place, and time.   Skin: Capillary refill takes less than 2 seconds. No rash noted.   Psychiatric: He has a normal mood and affect. His behavior is normal.   Vitals reviewed.    Ortho Exam   Extremity DVT signs are Negative by clinical screen.   Neurologic Exam     Mental Status   Oriented to person, place, and time.      Ortho Exam     No " snuffbox ttp  Neg josue's test      Assessment/Plan   Independent Review of Radiographic Studies:    No new imaging done today.  I did review x-ray imaging from the [facility] emergency room there is a fifth metacarpal head fracture with volar angulation.    Procedures       Phil was seen today for pain.    Diagnoses and all orders for this visit:    Closed displaced fracture of other part of fifth metacarpal bone of right hand, initial encounter    Pre-op testing       Orthopedic activities reviewed and patient expressed appreciation  Discussion of orthopedic goals  Risk, benefits, and merits of treatment alternatives reviewed with the patient and questions answered  The nature of the proposed surgery reviewed with the patient including risks, benefits, rehabilitation, recovery timeframe, and outcome expectations    Recommendations/Plan:  Patient is encouraged to call or return for any issues or concerns.    Risks, benefits, and alternative treatments discussed with the patient: [x] Yes [] No    Risk benefits and merits of the proposed surgery were discussed and the patient's questions were answered risks discussed including and not limited to:  Anesthesia reactions  Infection  Deep venous thrombosis and pulmonary embolus  Nerve, vascular or tendon injury  Fracture  Deformity  Stiffness  Weakness  Prosthesis and implant issues such as loosening, material wear or dislocation  Skin necrosis  Revision surgery or further treatment  Recurrence of problem and condition     Informed consent: [] Signed  [x] To be obtained at hospital  [] Both    Plan: Right hand closed reduction and pinning of the fifth metacarpal versus ORIF.    Patient was placed back into the Ortho-Glass boxer splint that he was already in     Patient agreeable to call or return sooner for any concerns.

## 2018-05-17 NOTE — OP NOTE
[address],  [address]  [place]  [address]  [phone]      OPERATIVE REPORT        PATIENT NAME:   Phil Case                            YOB: 1970       PREOP DIAGNOSIS:    Right hand small finger displaced unstable metacarpal fracture.    POSTOP DIAGNOSIS:    Same.    PROCEDURE:     Closed reduction, pinning, ulna gutter splint for fracture.    SURGEON:      Jackson Garcia MD    OPERATIVE TEAM:   Circulator: Mitali Ballesteros RN; Malena Suarez RN       Radiology Technologist: Jaren Munguia       Scrub Person: Dulce Lombardi CSA    ANESTHETIST:   RAVI: Que Duarte CRNA    ANESTHESIA:   General    ESTIM BLOOD LOSS:  1 ml    TEMPORARY IMPLANT:    0.062 K-wire.    COMPLICATIONS:     None.    DISPOSITION:     Stable to recovery.    INDICATIONS:     Displaced comminuted angulated rotated unstable metacarpal fracture.    NARRATIVE:     The patient presents for planned fracture treatment.  Risks, benefits and alternatives were discussed and informed consent for surgical procedure obtained. Risks were discussed including but not limited to anesthesia, infection, fracture displacement, malunion, nonunion and further treatment.  Goals include restoration of position of the bone and immobilization to improve fracture healing potential and functional outcome.    Antibiotic prophylaxis was given.  Surgeon site marking and a time out were performed prior to the procedure.  The hand, wrist and arm was prepped and draped in the usual sterile manner.  After effective anesthesia, the hand was gently manipulated and the fracture well reduced and aligned as seen with the C-arm.  There was full correction of the volar angulation as well as the rotational displacement.  A 0.062 K-wire was placed to stabilize the fracture.  There was a good reduction, alignment, correction of rotation, and clinical position of the finger, and good pin placement.  After pinning, with  full passive range of motion, the small finger remained in good anatomic clinical position and alignment.  C-arm images were saved.  The pin site was curved and capped and sterile xeroform and gauze dressing placed.  A well padded forearm based ulna gutter splint was applied.  Anesthesia was effective and well tolerated.  There were no complications of the procedure.  The patient was transferred in stable condition to recovery.

## 2018-05-17 NOTE — ANESTHESIA POSTPROCEDURE EVALUATION
Patient: Phil Case    Procedure Summary     Date:  05/17/18 Room / Location:  [facility] OR  /  [facility]    Anesthesia Start:  0957 Anesthesia Stop:  1051    Procedure:  RIGHT FIFTH METACARPAL CLOSED REDUCTION AND PINNING (Right Wrist) Diagnosis:       Pre-op testing      (Pre-op testing [Z01.818])    Surgeon:  Ranjit Garcia MD Provider:  Que Duarte CRNA    Anesthesia Type:  general ASA Status:  2          Anesthesia Type: general  Last vitals  BP   134/68 (05/17/18 1110)   Temp   98.5 °F (36.9 °C) (05/17/18 0843)   Pulse   (!) 45 (05/17/18 1110)   Resp   22 (05/17/18 1110)     SpO2   97 % (05/17/18 1110)     Post Anesthesia Care and Evaluation    Patient location during evaluation: PACU  Patient participation: complete - patient participated  Level of consciousness: awake  Pain score: 3  Pain management: adequate  Airway patency: patent  Anesthetic complications: No anesthetic complications  PONV Status: controlled  Cardiovascular status: acceptable and stable  Respiratory status: acceptable and nasal cannula  Hydration status: acceptable

## 2018-05-30 ENCOUNTER — OFFICE VISIT (OUTPATIENT)
Dept: ORTHOPEDIC SURGERY | Facility: CLINIC | Age: 48
End: 2018-05-30

## 2018-05-30 VITALS — HEIGHT: 71 IN | WEIGHT: 145 LBS | RESPIRATION RATE: 18 BRPM | BODY MASS INDEX: 20.3 KG/M2

## 2018-05-30 DIAGNOSIS — Z98.890 S/P ORIF (OPEN REDUCTION INTERNAL FIXATION) FRACTURE: Primary | ICD-10-CM

## 2018-05-30 DIAGNOSIS — S62.396A CLOSED DISPLACED FRACTURE OF OTHER PART OF FIFTH METACARPAL BONE OF RIGHT HAND, INITIAL ENCOUNTER: ICD-10-CM

## 2018-05-30 DIAGNOSIS — Z87.81 S/P ORIF (OPEN REDUCTION INTERNAL FIXATION) FRACTURE: ICD-10-CM

## 2018-05-30 DIAGNOSIS — Z98.890 S/P ORIF (OPEN REDUCTION INTERNAL FIXATION) FRACTURE: ICD-10-CM

## 2018-05-30 DIAGNOSIS — Z87.81 S/P ORIF (OPEN REDUCTION INTERNAL FIXATION) FRACTURE: Primary | ICD-10-CM

## 2018-05-30 PROCEDURE — 99024 POSTOP FOLLOW-UP VISIT: CPT | Performed by: PHYSICIAN ASSISTANT

## 2018-05-30 PROCEDURE — 29125 APPL SHORT ARM SPLINT STATIC: CPT | Performed by: PHYSICIAN ASSISTANT

## 2018-05-30 RX ORDER — HYDROCODONE BITARTRATE AND ACETAMINOPHEN 5; 325 MG/1; MG/1
1 TABLET ORAL EVERY 12 HOURS PRN
Qty: 20 TABLET | Refills: 0 | Status: SHIPPED | OUTPATIENT
Start: 2018-05-30

## 2018-05-30 NOTE — PROGRESS NOTES
"Subjective   Patient ID: Phil Case is a 47 y.o. right hand dominant male   Post-op and Pain of the Right Hand        History of Present Illness     Pain controlled: [] no   [x] yes   Medication refill requested: [x] no   [] yes    Patient compliant with instructions: [] no   [x] yes   Other: Reports good progress since surgery.  Patient denies numbness or tingling.  He states he has removed the splint to check the pin sites.  He denies drainage.  Denies fever or chills.    Patient does request a refill his pain medication      Past Medical History:   Diagnosis Date   • Asthma     AS A CHILD   • Hand injury     RIGHT   • Wears glasses     READING ONLY        Past Surgical History:   Procedure Laterality Date   • HAND OPEN REDUCTION INTERNAL FIXATION Right 5/17/2018    Procedure: RIGHT FIFTH METACARPAL CLOSED REDUCTION AND PINNING;  Surgeon: Ranjit Garcia MD;  Location: [facility];  Service: Orthopedics   • NO PAST SURGERIES         No Known Allergies    Review of Systems   Constitutional: Negative for fever.   HENT: Negative for voice change.    Eyes: Negative for visual disturbance.   Respiratory: Negative for shortness of breath.    Cardiovascular: Negative for chest pain.   Gastrointestinal: Negative for abdominal distention and abdominal pain.   Genitourinary: Negative for dysuria.   Musculoskeletal: Positive for arthralgias. Negative for gait problem and joint swelling.   Skin: Negative for rash.   Neurological: Negative for speech difficulty.   Hematological: Does not bruise/bleed easily.   Psychiatric/Behavioral: Negative for confusion.       Objective   Resp 18   Ht 180.3 cm (71\")   Wt 65.8 kg (145 lb)   BMI 20.22 kg/m²       Signs of infection: [x] no                    [] yes   Drainage: [x] no                    [] yes   Incision: [x] healing well     []healed well   Motor exam intact: [] no                    [x] yes   Neurovascular exam intact: [] no                    [x] yes   Signs " of compartment syndrome: [x] no                    [] yes   Signs of DVT: [x] no                    [] yes   Other:      Physical Exam   Constitutional: He is oriented to person, place, and time. He appears well-nourished.   Musculoskeletal:        Right hand: He exhibits decreased range of motion (mild stiffness to the 5th digit). He exhibits normal capillary refill and no deformity. Normal sensation noted. Normal strength noted. He exhibits no thumb/finger opposition.   Neurological: He is alert and oriented to person, place, and time.   Skin: Capillary refill takes less than 2 seconds.   Psychiatric: He has a normal mood and affect. His behavior is normal.   Vitals reviewed.    Ortho Exam   Extremity DVT signs are Negative by clinical screen.  Neurologic Exam     Mental Status   Oriented to person, place, and time.     Ortho Exam    Assessment/Plan   Independent Review of Radiographic Studies:    X-ray of the right hand performed in the office for the evaluation of fifth metacarpal fracture healing.  Comparison views are reviewed.  There is mild callus formation noted.  The patient is intact.     Procedures     Phil was seen today for post-op and pain.    Diagnoses and all orders for this visit:    S/P ORIF (open reduction internal fixation) fracture    Closed displaced fracture of other part of fifth metacarpal bone of right hand, initial encounter         Recommendations/Plan:     Sutures Staples or Pins [] Removed today  [] At prior visit  [x] Plan removal later   Splint/cast applied: []no []SAC []LAC []SLC []LLC []PTB [x]Splint: boxer splint   Brace: [x]not provided        []pt provided with:   Physical therapy: []home-based  []outpatient referral  [] therapy ongoing   Ultrasound: []not ordered         []order given to patient   Labs: []not ordered         []order given to patient   Weight Bearing status: []Full []WBAT []PWB [x]NWB []Other   Work/Activity status: []Regular []Medium []Light []Sedentary  [x]No use extr   Prescriptions: []None given today  [x]As Noted   Imaging at next appt: [x]Yes  []No          Additional instructions: Patient agreeable to return sooner for any new concern.     Regular exercise as tolerated  Orthopedic activities reviewed and patient expressed appreciation  Discussion of orthopedic goals  Risk, benefits, and merits of treatment alternatives reviewed with the patient and questions answered      Exercise, medications, injections, other patient advice, and return appointment as noted.  Patient is encouraged to call or return for any issues or concerns.    Patient was placed in a new Ortho-Glass boxer splint.  Do not remove the splint.  Follow-up in 3 weeks with anticipation of pain removal.  Patient agreeable to call or return sooner for any concerns.

## 2018-05-31 ENCOUNTER — TELEPHONE (OUTPATIENT)
Dept: ORTHOPEDIC SURGERY | Facility: CLINIC | Age: 48
End: 2018-05-31

## 2018-06-20 DIAGNOSIS — Z98.890 S/P ORIF (OPEN REDUCTION INTERNAL FIXATION) FRACTURE: Primary | ICD-10-CM

## 2018-06-20 DIAGNOSIS — Z87.81 S/P ORIF (OPEN REDUCTION INTERNAL FIXATION) FRACTURE: Primary | ICD-10-CM

## 2018-06-21 ENCOUNTER — OFFICE VISIT (OUTPATIENT)
Dept: ORTHOPEDIC SURGERY | Facility: CLINIC | Age: 48
End: 2018-06-21

## 2018-06-21 VITALS — BODY MASS INDEX: 20.31 KG/M2 | RESPIRATION RATE: 16 BRPM | WEIGHT: 145.06 LBS | HEIGHT: 71 IN

## 2018-06-21 DIAGNOSIS — S62.396D CLOSED NONDISPLACED FRACTURE OF OTHER PART OF FIFTH METACARPAL BONE OF RIGHT HAND WITH ROUTINE HEALING, SUBSEQUENT ENCOUNTER: Primary | ICD-10-CM

## 2018-06-21 DIAGNOSIS — M25.641 FINGER STIFFNESS, RIGHT: ICD-10-CM

## 2018-06-21 PROCEDURE — 99024 POSTOP FOLLOW-UP VISIT: CPT | Performed by: PHYSICIAN ASSISTANT

## 2018-06-21 NOTE — PROGRESS NOTES
Subjective   Patient ID: Phil Case is a 47 y.o.  male        History of Present Illness    Patient is following up for scheduled routine follow-up visit in regards to closed reduction and pinning of the right fifth metacarpal.  Patient states he still having some pain but denies numbness or tingling.  Denies drainage.  He has been immobilized since the date of surgery on 5/17/2018.      Pain Score: worst possible pain                         Past Medical History:   Diagnosis Date   • Asthma     AS A CHILD   • Hand injury     RIGHT   • Wears glasses     READING ONLY        Past Surgical History:   Procedure Laterality Date   • HAND OPEN REDUCTION INTERNAL FIXATION Right 5/17/2018    Procedure: RIGHT FIFTH METACARPAL CLOSED REDUCTION AND PINNING;  Surgeon: Ranjit Garcia MD;  Location: [facility];  Service: Orthopedics   • NO PAST SURGERIES         Family History   Problem Relation Age of Onset   • Hypertension Mother    • Diabetes Father    • Cancer Sister    • No Known Problems Brother        Social History     Social History   • Marital status: Single     Spouse name: N/A   • Number of children: N/A   • Years of education: N/A     Occupational History   • Not on file.     Social History Main Topics   • Smoking status: Current Every Day Smoker     Packs/day: 0.25     Years: 10.00     Types: Cigarettes   • Smokeless tobacco: Never Used   • Alcohol use Yes      Comment: occassional   • Drug use: No   • Sexual activity: Defer     Other Topics Concern   • Not on file     Social History Narrative   • No narrative on file         Current Outpatient Prescriptions:   •  HYDROcodone-acetaminophen (NORCO) 5-325 MG per tablet, Take 1 tablet by mouth Every 12 (Twelve) Hours As Needed (PRN POST OP PAIN)., Disp: 20 tablet, Rfl: 0  •  ibuprofen (ADVIL,MOTRIN) 200 MG tablet, Take 200 mg by mouth Every 6 (Six) Hours As Needed for Mild Pain ., Disp: , Rfl:     No Known Allergies    Review of Systems   Constitutional:  "Positive for activity change. Negative for fever.   HENT: Negative for voice change.    Eyes: Negative for visual disturbance.   Respiratory: Negative for shortness of breath.    Cardiovascular: Negative for chest pain.   Gastrointestinal: Negative for abdominal distention and abdominal pain.   Genitourinary: Negative for dysuria.   Musculoskeletal: Positive for arthralgias. Negative for gait problem and joint swelling.   Skin: Negative for rash.   Neurological: Negative for speech difficulty.   Hematological: Does not bruise/bleed easily.   Psychiatric/Behavioral: Negative for confusion.   All other systems reviewed and are negative.      Objective   Resp 16   Ht 180.3 cm (70.98\")   Wt 65.8 kg (145 lb 1 oz)   BMI 20.24 kg/m²    Physical Exam   Constitutional: He is oriented to person, place, and time. He appears well-nourished.   Pulmonary/Chest: No respiratory distress.   Abdominal: He exhibits no distension.   Musculoskeletal:        Right wrist: He exhibits tenderness (5th MC ). He exhibits normal range of motion, no swelling, no effusion, no crepitus, no deformity and no laceration.        Right hand: He exhibits decreased range of motion (stiffness to the 5th digit after splint removal). He exhibits no tenderness, normal capillary refill and no deformity. Normal sensation noted. Normal strength noted. He exhibits no finger abduction, no thumb/finger opposition and no wrist extension trouble.   Neurological: He is alert and oriented to person, place, and time.   Skin: Capillary refill takes less than 2 seconds.   Psychiatric: He has a normal mood and affect.   Vitals reviewed.    Ortho Exam     Neurologic Exam     Mental Status   Oriented to person, place, and time.      Ortho Exam         Assessment/Plan   Independent Review of Radiographic Studies:    Indication to evaluate fracture healing, and compared with prior imaging, shows interm fracture healing, callus formation and or periostitis in continued " good position and alignment.      Procedures       Phil was seen today for follow-up.    Diagnoses and all orders for this visit:    Closed nondisplaced fracture of other part of fifth metacarpal bone of right hand with routine healing, subsequent encounter  -     Ambulatory Referral to Physical Therapy Evaluate and treat, Ortho; Electrotherapy; Iontophoresis; Soft Tissue Mobilizaton, Desensitization; Stretching, Strengthening, ROM; Full weight bearing    Finger stiffness, right  -     Ambulatory Referral to Physical Therapy Evaluate and treat, Ortho; Electrotherapy; Iontophoresis; Soft Tissue Mobilizaton, Desensitization; Stretching, Strengthening, ROM; Full weight bearing       Orthopedic activities reviewed and patient expressed appreciation  Discussion of orthopedic goals  Risk, benefits, and merits of treatment alternatives reviewed with the patient and questions answered    Recommendations/Plan:  Exercise, medications, injections, other patient advice, and return appointment as noted.  Patient is encouraged to call or return for any issues or concerns.  Because there is significant callus formation noted on the x-ray he no longer requires immobilization rather around like to start him in formal physical therapy starting next week.  He is advised to refrain from strenuous physical activity for at least 2 more weeks      Patient agreeable to call or return sooner for any concerns.

## 2022-12-29 ENCOUNTER — OFFICE VISIT (OUTPATIENT)
Dept: PSYCHIATRY | Facility: CLINIC | Age: 52
End: 2022-12-29
Payer: MEDICAID

## 2022-12-29 DIAGNOSIS — Z87.898 HISTORY OF SUBSTANCE USE: Primary | ICD-10-CM

## 2022-12-29 PROCEDURE — 90791 PSYCH DIAGNOSTIC EVALUATION: CPT | Performed by: SOCIAL WORKER

## 2023-01-05 ENCOUNTER — OFFICE VISIT (OUTPATIENT)
Dept: PSYCHIATRY | Facility: CLINIC | Age: 53
End: 2023-01-05
Payer: MEDICAID

## 2023-01-05 DIAGNOSIS — F43.23 ADJUSTMENT DISORDER WITH MIXED ANXIETY AND DEPRESSED MOOD: Primary | ICD-10-CM

## 2023-01-05 PROCEDURE — 90832 PSYTX W PT 30 MINUTES: CPT | Performed by: COUNSELOR

## 2023-01-05 NOTE — PROGRESS NOTES
Date:2023   Patient Name: Phil Case  : 1970   MRN: [id number]   Time IN: ***    Time OUT: ***     Referring Provider: Provider, No Known    Chief Complaint:    No diagnosis found.     History of Present Illness:   Phil Case is a 52 y.o. male who is being seen today for individual Psychotherapy session ***      Past Psychiatric History:   ***    Assessment Scores:   PHQ-9 Total Score:    JOESPH-7 Score:      Work History:   Highest level of education obtained: {MISC; EDUCATION LEVELS:38351}  Ever been active duty in the ? {Yes or No:52998}  Patient's Occupation: ***    Interpersonal/Relational:  Marital Status: {MARITAL STATUS:21390}  Support system: {support system:65569}    Mental/Behavioral Health History:  History of prior treatment or hospitalization: ***  Past diagnoses: ***  Are there any significant health issues (current or past): ***  History of seizures: {Yes or No:51401}    Family Psychiatric History:  ***    History of Substance Use:  Patient answered {Yes or No:96523}  ***    Significant Life Events:   Verbal, physical, sexual abuse? {Yes or No:02419}  Has patient experienced a death / loss of relationship? {Yes or No:44687}  Has patient experienced a major accident or tragic events? {Yes or No:19757}    Triggers: (Persons/Places/Things/Events/Thought/Emotions): ***    Social History:   Social History     Socioeconomic History   • Marital status: Single   Tobacco Use   • Smoking status: Every Day     Packs/day: 0.25     Years: 10.00     Pack years: 2.50     Types: Cigarettes   • Smokeless tobacco: Never   Substance and Sexual Activity   • Alcohol use: Yes     Comment: occassional   • Drug use: No   • Sexual activity: Defer        Past Medical History:   Past Medical History:   Diagnosis Date   • Asthma     AS A CHILD   • Hand injury     RIGHT   • Wears glasses     READING ONLY       Past Surgical History:   Past Surgical History:   Procedure Laterality Date   •  HAND OPEN REDUCTION INTERNAL FIXATION Right 5/17/2018    Procedure: RIGHT FIFTH METACARPAL CLOSED REDUCTION AND PINNING;  Surgeon: Ranjit Garcia MD;  Location: [facility];  Service: Orthopedics   • NO PAST SURGERIES         Family History:   Family History   Problem Relation Age of Onset   • Hypertension Mother    • Diabetes Father    • Cancer Sister    • No Known Problems Brother        Medications:     Current Outpatient Medications:   •  HYDROcodone-acetaminophen (NORCO) 5-325 MG per tablet, Take 1 tablet by mouth Every 12 (Twelve) Hours As Needed (PRN POST OP PAIN)., Disp: 20 tablet, Rfl: 0  •  ibuprofen (ADVIL,MOTRIN) 200 MG tablet, Take 200 mg by mouth Every 6 (Six) Hours As Needed for Mild Pain ., Disp: , Rfl:     Allergies:   No Known Allergies    Mental Status Exam:   Hygiene:   {good/fair/poor:646170290}  Cooperation:  {Cooperation:520935706}  Eye Contact:  {Eye Contact:852190273}  Psychomotor Behavior:  {Psychomotor Behavior:35976}  Affect:  {Affect:660429505}  Mood: {Mood:98152}  Speech:  {Speech:135767765}  Thought Process:  {Thought Process:965493283}  Thought Content:  {Thought Content:400215723}  Suicidal:  {Suicidal:006535347}  Homicidal:  {Homidical:079113746}  Hallucinations:  {Hallucinations:185599437}  Delusion:  {Delusion:361589503}  Memory:  {Memory:091043639}  Orientation:  {Orientation:802915430}  Reliability:  {good/fair/poor:039726561}  Insight:  {good/fair/poor/none:982282783}  Judgement:  {good/fair/poor/impaired:505329820}  Impulse Control:  {good/fair/poor/impaired:101026399}  Physical/Medical Issues:  {No/Yes:261446041}     SUICIDE RISK ASSESSMENT/CSSRS:  1. Does patient have thoughts of suicide? {Yes or No:41294}  2. Does patient have intent for suicide? {Yes or No:23110}  3. Does patient have a current plan for suicide? {Yes or No:62118}  4. History of suicide attempts: {Yes or No:89841}  5. Family history of suicide or attempts: {Yes or No:29545}  6. History of violent  behaviors towards others or property: {Yes or No:24723}  7. History of sexual aggression toward others: {Yes or No:22259}  8. Access to firearms or weapons: {Yes or No:08710}    Visit Diagnosis/Orders Placed This Visit:  No diagnosis found.     PLAN:  1. Safety: {Safety :72186}  2. Risk Assessment: Risk of self-harm acutely is low. Risk of self-harm chronically is also low, but could be further elevated in the event of treatment noncompliance and/or AODA.    Treatment Plan/ Short and Long Term Goals: Continue supportive psychotherapy efforts and medications as indicated. Treatment and medication options discussed during today's visit. Patient ackowledged and verbally consented to continue with current treatment plan and was educated on the importance of compliance with treatment and follow-up appointments. Patient seems reasonably able to adhere to treatment plan.      Assisted Patient in processing above session content; acknowledged and normalized patient’s thoughts, feelings, and concerns.  Rationalized patient thought process regarding ***.      Allowed Patient to freely discuss issues  without interruption or judgement with unconditional positive regard, active listening skills, and empathy. Therapist provided a safe, confidential environment to facilitate the development of a positive therapeutic relationship and encouraged open, honest communication. Assisted Patient in identifying risk factors which would indicate the need for higher level of care including thoughts to harm self or others and/or self-harming behavior and encouraged Patient to contact this office, call 911, or present to the nearest emergency room should any of these events occur. Discussed crisis intervention services and means to access. Patient adamantly and convincingly denies current suicidal or homicidal ideation or perceptual disturbance. Assisted Patient in processing session content; acknowledged and normalized Patient’s thoughts,  feelings, and concerns by utilizing a person-centered approach in efforts to build appropriate rapport and a positive therapeutic relationship with open and honest communication.     Quality Measures:     TOBACCO USE:  {[facility]_Smoking_Cessation:29201}    I advised Phil of the risks of tobacco use.     Follow Up:   No follow-ups on file.      DEDE Cordova   [facility]     This document has been electronically signed by DEDE Cordova   January 5, 2023 10:05 EST

## 2023-01-05 NOTE — PROGRESS NOTES
IOP Initial Assessment   Assessment completed on 12/29/2022  Date: 12/29/2022  Name: Phil Case    PCP: Patient reports he does not presently have a PCP.   Referred by: [facility]    Confidentiality and reporting requirements verbally explained to patient during assessment and patient verbally agreed. Clinician explained that this was an assessment for CD-IOP.     Identifying Information:   Assessment completed on 12/29/2022  Phil Case, is a 52 y.o. male presents for an initial IOP assessment and initial with William Turk LCSW at [facility].    Patient reports that he was sent by [facility] for a substance use assessment. Patient reports that he is in the process of trying to get custody of his son (age 2). Patient reports he also has 4 older children. Patient reports he lives in [place]. Patient reports he lives with a roommate. Patient reports it is just the two of them in the home. Patient reports he is employed at [employer] and works part time to full time hours. 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 Patient described his living situation as “great, clean”. Patient reports he has an ID and is planning on taking test. Patient reports mode of transportation is car. Patient reports sober support system is friend. Patient reports he also still talks to sponsor.     History Present Illness, Onset, Duration, Course:   Patient during assessment discussed substance use history. Patient reports history of nicotine use. Patient reports he currently uses E-Cigarettes. Patient reported age at first use was age 20. Patient reports amount is a cigarette or two per day. Patient reported last use was date of assessment.     Patient reports history of alcohol use. Patient reports age at first use was age 21. Patient reports he uses alcohol on “special occasions”. Patient reports in the past month, he has used none. Patient reported last use was in June of 2020. As intake  paperwork showed different last use date, patient wrote a statement clarifying this discrepancy which has been scanned into chart.     Patient during assessment reports history of marijuana use. Patient reports age at first use was age 16. Patient reports he used for two years. Patient reports frequency of use was on the weekends. Patient reports last use was age 18.     Previous Psychiatric History:    History of prior treatment or hospitalization: Patient during assessment discussed his previous treatment history. Patient reports he did long term treatment at the [facility] in 2018. Patient reports he has not done therapy in the past. Patient reports no rehabs or detoxes. Patient denied history of mental health hospitalizations.     History of use of psychotropics: Patient reports no medications currently.     History of suicide attempts: Patient during assessment denied current suicidal thoughts or plan or intent to hurt self. Patient during assessment denied history of suicidal thoughts or plan or intent to hurt self. Patient during assessment denied history of death wishes. Patient during assessment denied history of suicide attempts or self-harm.     History of violence: Patient during assessment denied current or history of homicidal thoughts or plan or intent to hurt others. Patient during assessment denied history of violence.     Personal Assessment: 1-10 Scale (10 worst)    Anxiety: Patient during assessment rated anxiety as a 1 on a 1:10 scale (1-low).   Depression: Patient during assessment rated depression as a 1 on a 1:10 scale (1-low).     Suicidal Ideation:   Patient during assessment denied current suicidal thoughts or plan or intent to hurt self. Patient during assessment denied history of suicidal thoughts or plan or intent to hurt self. Patient during assessment denied history of death wishes. Patient during assessment denied history of suicide attempts or self-harm. Patient during  assessment denied current or history of homicidal thoughts or plan or intent to hurt others. Patient during assessment denied history of violence. Patient during assessment denied history of hallucinations.     Family Psychiatric History:  Family history is significant for psychiatric issues: Patient reported none.     Family history is significant for substance abuse issues: Patient reported none.     Life Events/Trauma History:   Patient during assessment reported no history of trauma or abuse.     Medical History:   I have reviewed this patient's past medical history.    Are there any significant health issues (current or past): Patient reported he has sinuses.     History of seizures: Patient during assessment denied history of seizures.     Family History   Problem Relation Age of Onset   • Hypertension Mother    • Diabetes Father    • Cancer Sister    • No Known Problems Brother        Current Medications:   Current Outpatient Medications   Medication Sig Dispense Refill   • HYDROcodone-acetaminophen (NORCO) 5-325 MG per tablet Take 1 tablet by mouth Every 12 (Twelve) Hours As Needed (PRN POST OP PAIN). 20 tablet 0   • ibuprofen (ADVIL,MOTRIN) 200 MG tablet Take 200 mg by mouth Every 6 (Six) Hours As Needed for Mild Pain .       No current facility-administered medications for this visit.       Relational History:  Difficulty getting along with peers: no  Difficulty making new friendships: no  Difficulty maintaining friendships: no  Close with family members: yes    Legal History:  In discussing legal history, patient reports he was charged with trafficking in 2015 and spent a year in shelter. Patient reported he is on MRS currently.     Substance Abuse History:   Patient during assessment discussed substance use history. Patient reports history of nicotine use. Patient reports he currently uses E-Cigarettes. Patient reported age at first use was age 20. Patient reports amount is a cigarette or two per day.  Patient reported last use was date of assessment.     Patient reports history of alcohol use. Patient reports age at first use was age 21. Patient reports he uses alcohol on “special occasions”. Patient reports in the past month, he has used none. Patient reported last use was in June of 2020. As intake paperwork showed different last use date, patient wrote a statement clarifying this discrepancy which has been scanned into chart.     Patient during assessment reports history of marijuana use. Patient reports age at first use was age 16. Patient reports he used for two years. Patient reports frequency of use was on the weekends. Patient reports last use was age 18.     History of DT's: Unknown to clinician.                     History of Seizures: Patient during assessment denied history of seizures.     Withdrawal Symptoms: Patient during assessment denied withdrawal symptoms.       Cravings: Patient during assessment rated cravings as a 1 on a 1:10 scale (1-low).      Urine drug screen today was presumptively positive for: N/A.     Mental Status Exam:   Affect: Appropriate  Cooperation: Cooperative  Delusion: None  Eye Contact: Good  Hallucinations: Patient during assessment denied history of hallucinations.   Homicidal: Patient during assessment denied current or history of homicidal thoughts or plan or intent to hurt others.  Suicidal: Patient during assessment denied current suicidal thoughts or plan or intent to hurt self. Patient during assessment denied history of suicidal thoughts or plan or intent to hurt self. Patient during assessment denied history of death wishes.   Cognition: Good  Memory: Intact  Orientation: Person, Place, Time and Situation  Psychomotor Behaviors: Appropriate  Speech: Normal  Though Content: Normal  Thought Progress: Linear  Hopelessness: When asked about feelings of hopelessness, patient reported “never”.   Reliability: fair  Insight: Fair  Judgement: Fair  Impulse Control:  Fair  Physical/Medical Issues: Patient reported he has sinuses.     Patient resources/assets: Patient reports that he is employed and patient identified friend as a sober support system.     ASAM Dimensions:  I.    Intoxication/Withdrawal:  0  (Patient during initial assessment denied recent substance use).  II.   Medical Conditions/Complications:  0 (In discussing medical conditions, patient reported he has sinuses).  III.  Behavioral/Emotional/Cognitive: 0 (Patient scored a 00 on both the JOESPH-7 and PHQ-9 scale).  IV.  Readiness to Change: 0 (Patient discussed that he is getting custody of son and he was ordered for substance use assessment).  V.   Relapse Risk: 1 (Due to substance use history).  VI:  Recovery Environment: 0 (Patient during initial assessment described living situation as \"great, clean\").  Total ASAM Score = 01     Baseline Scores: 12/29/2022  JOESPH-7   (00)                  PHQ-9   (00)       Impression/Formulation:  Substance use disorder diagnostic criteria verbally reviewed with patient during assessment for alcohol use and marijuana use. Based on patient self-report during this assessment, patient met 0 of 11 criteria for alcohol use and patient met 0 of 11 criteria for marijuana use. As patient reported that he has used substances historically, diagnosis of history of substance use listed.     VISIT DIAGNOSIS:     ICD-10-CM ICD-9-CM   1. History of substance use  Z87.898 V15.89      Patient appeared alert and oriented.     Plan:  Confidentiality and reporting requirements verbally explained during assessment and patient verbally agreed.     Safety plan of report to police or hospital, or call 911 if feeling unsafe, if having suicidal or homicidal thoughts, or if in emergency need of medications verbally reviewed with patient during assessment and suicide prevention hotline number verbally provided to patient during assessment, patient during assessment verbally agreed to safety plan. Patient  signed a hard copy of safety plan which has been scanned into chart.     Based on patient self-report during assessment and ASAM score, clinician at this time is recommending a lower level of care than CD-IOP. Patient is scheduled to meet with [facility] at [facility] on 1/5/2023.    William Turk LCSW  1/5/2023  09:55 EST

## 2023-01-05 NOTE — PROGRESS NOTES
Date:2023   Patient Name: Phil Case  : 1970   MRN: [id number]   Time IN: 1:35   Time OUT: 2:10     Referring Provider: Provider, No Known    Chief Complaint:      ICD-10-CM ICD-9-CM   1. Adjustment disorder with mixed anxiety and depressed mood  F43.23 309.28        History of Present Illness:   Phil Case is a 52 y.o. male who is being seen today for individual Psychotherapy session. Patient reports that he was [facility] for a Mental health assessment. Pt reports that he is in the process of trying to get custody of his son (age 2).  Pt reports he lives in [place]. Pt reports he lives with a roommate. Pt reports it is just the two of them in the home. Pt reports he is employed at [employer] and works part time to full time hours. 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 Pt described his living situation as “great, clean”. Pt reports he has an ID and is planning on taking test. Patient reports mode of transportation is car. PT expressed that feels that he is on task with personal goal. PT identified some near death experiences that affected him through out his life and has found assistance with his Religion beliefs. Pt identified that he struggles with restlessness, avoids particular places and is wanted to challenge himself to visit those places again. Pt identified some struggle with focusing on some tasks but uses positive thinking to assist him with staying on task. Pt expressed that he is using positive thinking to assist him with grief.       Past Psychiatric History:   no    Assessment Scores:   PHQ-9 Total Score:    JOESPH-7 Score:      Work History:   Highest level of education obtained: 12th grade  Ever been active duty in the ? no  Patient's Occupation: [employer]    Interpersonal/Relational:  Marital Status: not   Support system: / parents, friends and patient siblings    Mental/Behavioral Health History:  History  of prior treatment or hospitalization: no  Past diagnoses: no  Are there any significant health issues (current or past): no  History of seizures: no    Family Psychiatric History:  Pt reports no    History of Substance Use:  Patient answered yes  Marijuana when he was younger. PT self reported that it has been almost 30 years ago when he last used    Significant Life Events:   Verbal, physical, sexual abuse? no  Has patient experienced a death / loss of relationship? Yes, lost younger brother 5 years ago, and sister less than 6 months ago  Has patient experienced a major accident or tragic events? Yes, experienced a life threatening events    Triggers: (Persons/Places/Things/Events/Thought/Emotions): places related to the life events    Social History:   Social History     Socioeconomic History   • Marital status: Single   Tobacco Use   • Smoking status: Every Day     Packs/day: 0.25     Years: 10.00     Pack years: 2.50     Types: Cigarettes   • Smokeless tobacco: Never   Substance and Sexual Activity   • Alcohol use: Yes     Comment: occassional   • Drug use: No   • Sexual activity: Defer        Past Medical History:   Past Medical History:   Diagnosis Date   • Asthma     AS A CHILD   • Hand injury     RIGHT   • Wears glasses     READING ONLY       Past Surgical History:   Past Surgical History:   Procedure Laterality Date   • HAND OPEN REDUCTION INTERNAL FIXATION Right 5/17/2018    Procedure: RIGHT FIFTH METACARPAL CLOSED REDUCTION AND PINNING;  Surgeon: Ranjit Garcia MD;  Location: [facility];  Service: Orthopedics   • NO PAST SURGERIES         Family History:   Family History   Problem Relation Age of Onset   • Hypertension Mother    • Diabetes Father    • Cancer Sister    • No Known Problems Brother        Medications:     Current Outpatient Medications:   •  HYDROcodone-acetaminophen (NORCO) 5-325 MG per tablet, Take 1 tablet by mouth Every 12 (Twelve) Hours As Needed (PRN POST OP PAIN)., Disp: 20  tablet, Rfl: 0  •  ibuprofen (ADVIL,MOTRIN) 200 MG tablet, Take 200 mg by mouth Every 6 (Six) Hours As Needed for Mild Pain ., Disp: , Rfl:     Allergies:   No Known Allergies    Mental Status Exam:   Hygiene:   good  Cooperation:  Guarded  Eye Contact:  Good  Psychomotor Behavior:  Restless  Affect:  Appropriate  Mood: normal  Speech:  Pressured  Thought Process:  Linear  Thought Content:  Mood congruent  Suicidal:  None  Homicidal:  None  Hallucinations:  None  Delusion:  None  Memory:  Intact  Orientation:  Person  Reliability:  fair  Insight:  Fair  Judgement:  Fair  Impulse Control:  Fair  Physical/Medical Issues:  No      SUICIDE RISK ASSESSMENT/CSSRS:  1. Does patient have thoughts of suicide? no  2. Does patient have intent for suicide? no  3. Does patient have a current plan for suicide? no  4. History of suicide attempts: no  5. Family history of suicide or attempts: no  6. History of violent behaviors towards others or property: no  7. History of sexual aggression toward others: no  8. Access to firearms or weapons: no    Visit Diagnosis/Orders Placed This Visit:    ICD-10-CM ICD-9-CM   1. Adjustment disorder with mixed anxiety and depressed mood  F43.23 309.28        PLAN:  1. Safety: No acute safety concerns  2. Risk Assessment: Risk of self-harm acutely is low. Risk of self-harm chronically is also low, but could be further elevated in the event of treatment noncompliance and/or AODA.    Treatment Plan/ Short and Long Term Goals: Continue supportive psychotherapy efforts and medications as indicated. Treatment and medication options discussed during today's visit. Patient ackowledged and verbally consented to continue with current treatment plan and was educated on the importance of compliance with treatment and follow-up appointments. Patient seems reasonably able to adhere to treatment plan.      Assisted Patient in processing above session content; acknowledged and normalized patient’s thoughts,  feelings, and concerns.  Rationalized patient thought process regarding .      Allowed Patient to freely discuss issues  without interruption or judgement with unconditional positive regard, active listening skills, and empathy. Therapist provided a safe, confidential environment to facilitate the development of a positive therapeutic relationship and encouraged open, honest communication. Assisted Patient in identifying risk factors which would indicate the need for higher level of care including thoughts to harm self or others and/or self-harming behavior and encouraged Patient to contact this office, call 911, or present to the nearest emergency room should any of these events occur. Discussed crisis intervention services and means to access. Patient adamantly and convincingly denies current suicidal or homicidal ideation or perceptual disturbance. Assisted Patient in processing session content; acknowledged and normalized Patient’s thoughts, feelings, and concerns by utilizing a person-centered approach in efforts to build appropriate rapport and a positive therapeutic relationship with open and honest communication.     Quality Measures:     TOBACCO USE:  Current every day smoker less than 3 minutes spent counseling .    I advised Phil of the risks of tobacco use.     Follow Up:   No follow-ups on file.      DEDE Cordova   [facility]     This document has been electronically signed by DEDE Cordova   January 5, 2023 16:33 EST

## (undated) DEVICE — SUT VIC 2/0 SH 27IN

## (undated) DEVICE — SUT ETHLN 4/0 PS2 PLSTC 1667G

## (undated) DEVICE — OCCLUSIVE GAUZE STRIP,3% BISMUTH TRIBROMOPHENATE IN PETROLATUM BLEND: Brand: XEROFORM

## (undated) DEVICE — BALL PIN JURGAN .062X3/8 DIA

## (undated) DEVICE — GOWN,SIRUS,NON REINFRCD,LARGE,SET IN SL: Brand: MEDLINE

## (undated) DEVICE — SPNG GZ WOVN 4X4IN 12PLY 10/BX STRL

## (undated) DEVICE — PAD UNDERCAST WYTEX 2IN 4YD LF STRL

## (undated) DEVICE — PK EXTRM UPPR 20

## (undated) DEVICE — CLAVICLE STRAP: Brand: DEROYAL

## (undated) DEVICE — GLV SURG SENSICARE W/ALOE PF LF 8 STRL

## (undated) DEVICE — UNDYED BRAIDED (POLYGLACTIN 910), SYNTHETIC ABSORBABLE SUTURE: Brand: COATED VICRYL

## (undated) DEVICE — BNDG ELAS MATRX V/CLS 4IN 5YD LF